# Patient Record
Sex: MALE | Race: WHITE | NOT HISPANIC OR LATINO | URBAN - METROPOLITAN AREA
[De-identification: names, ages, dates, MRNs, and addresses within clinical notes are randomized per-mention and may not be internally consistent; named-entity substitution may affect disease eponyms.]

---

## 2017-01-05 ENCOUNTER — APPOINTMENT (RX ONLY)
Dept: URBAN - METROPOLITAN AREA CLINIC 157 | Facility: CLINIC | Age: 77
Setting detail: DERMATOLOGY
End: 2017-01-05

## 2017-01-05 DIAGNOSIS — L0391 CELLULITIS AND ABSCESS OF UNSPECIFIED SITES: ICD-10-CM

## 2017-01-05 DIAGNOSIS — L259 CONTACT DERMATITIS AND OTHER ECZEMA, UNSPECIFIED CAUSE: ICD-10-CM | Status: WORSENING

## 2017-01-05 DIAGNOSIS — L0390 CELLULITIS AND ABSCESS OF UNSPECIFIED SITES: ICD-10-CM

## 2017-01-05 DIAGNOSIS — R60.0 LOCALIZED EDEMA: ICD-10-CM

## 2017-01-05 PROBLEM — L03.818 CELLULITIS OF OTHER SITES: Status: ACTIVE | Noted: 2017-01-05

## 2017-01-05 PROBLEM — L23.9 ALLERGIC CONTACT DERMATITIS, UNSPECIFIED CAUSE: Status: ACTIVE | Noted: 2017-01-05

## 2017-01-05 PROCEDURE — 99214 OFFICE O/P EST MOD 30 MIN: CPT

## 2017-01-05 PROCEDURE — ? RECOMMENDATIONS

## 2017-01-05 ASSESSMENT — BSA RASH: BSA RASH: 80

## 2017-01-05 NOTE — PROCEDURE: RECOMMENDATIONS
Detail Level: Simple
Recommendations (Free Text): Patient had a biopsy completed at last visit by  c/w allergic contact dermatitis. He has been treating with topical steroid. He has at least 80%BSA covered with erythematous papules and excoriations. Would consider oral steroids given amount of involvement and still symptomatic. Would consider patch testing
Recommendation Preamble: The following recommendations were made during the visit:
Recommendations (Free Text): Patient with a h/o blood clots (DVT, no PE) last of which was 2002 per his report. Today with significant edema and swelling on the left lower extremity, right is wnl. Discussed symptoms of SOB, fatigue which he denies. Would recommend LE dopplars and INR check. Patient was previously started on Lamisil 10 days ago which can increase or decrease the INR. His last INR check was 1 month ago of 2.2 per his report. Recommending further evaluation to r/o DVT today and STOPPING Lamisil
Recommendations (Free Text): Erythema, swelling with yellow discharge of the LLE. Recommend oral antibiotics (Keflex 500mg PO BID X 14 days) to cover MRSA as well. Will discontinue the Lamisil and topical steroids at this time.

## 2017-01-06 ENCOUNTER — RX ONLY (OUTPATIENT)
Age: 77
Setting detail: RX ONLY
End: 2017-01-06

## 2017-01-06 RX ORDER — TRIAMCINOLONE ACETONIDE 1 MG/G
OINTMENT TOPICAL
Qty: 1 | Refills: 2 | Status: ERX | COMMUNITY
Start: 2017-01-06

## 2017-01-06 RX ORDER — TRIAMCINOLONE ACETONIDE 1 MG/G
CREAM TOPICAL
Qty: 1 | Refills: 2 | Status: ERX

## 2017-01-06 RX ORDER — CEPHALEXIN 500 MG/1
CAPSULE ORAL
Qty: 28 | Refills: 0 | Status: ERX | COMMUNITY
Start: 2017-01-06